# Patient Record
Sex: MALE | Race: WHITE | NOT HISPANIC OR LATINO | Employment: OTHER | URBAN - METROPOLITAN AREA
[De-identification: names, ages, dates, MRNs, and addresses within clinical notes are randomized per-mention and may not be internally consistent; named-entity substitution may affect disease eponyms.]

---

## 2019-06-04 ENCOUNTER — OFFICE VISIT (OUTPATIENT)
Dept: SLEEP MEDICINE | Facility: HOSPITAL | Age: 74
End: 2019-06-04

## 2019-06-04 VITALS
DIASTOLIC BLOOD PRESSURE: 68 MMHG | HEART RATE: 98 BPM | SYSTOLIC BLOOD PRESSURE: 133 MMHG | HEIGHT: 72 IN | BODY MASS INDEX: 42.66 KG/M2 | OXYGEN SATURATION: 95 % | WEIGHT: 315 LBS

## 2019-06-04 DIAGNOSIS — G47.33 OBSTRUCTIVE SLEEP APNEA: Primary | ICD-10-CM

## 2019-06-04 PROCEDURE — G0463 HOSPITAL OUTPT CLINIC VISIT: HCPCS

## 2019-06-04 NOTE — PROGRESS NOTES
"Sleep Medicine visit Note    Name: Kiet Briceño  Age: 73 y.o.  Sex: male  :  1945  MRN: [id number]  Date of Service: 2019    History of Present Illness:   Kiet Briceño is a 73 y.o. male comes for a follow up visit.     Patients Polysomnography performed at sleep medicine specialists on [facility] in 2015 has revealed ENRIKE with an AHI of 108 per hour of sleep. minimum SPO2  was 67 %. [place] Sleepiness Scale score prior to CPAP therapy was 18/24.    The patient has Severe obstructive sleep apnea. The patient is on BiPAP therapy at a mean pressure of 18/14 cms of water. Residual AHI 1.1/ sleep hour. Compliance data to indicate 100% usage for more than 4 hours with an average usage of 8 hours and 52 minutes.  [place] sleepiness scale score with CPAP therapy is 2/24 with CPAP therapy.  Patient has 2 BiPAP machines and the one he brought he uses 30% and he has another machine that he used for the rest of the time.    The patient is present on BiPAP machine is defective and is not functioning well and he needs a new BiPAP machine.    Review of Systems - Negative except frequent urination, arthralgia and myalgia, occasional skin lesions.  No complaints of chest pain or shortness of breath or palpitations.    PMH, Surgical Hx, current Medications, Allergies, Family Hx, Social Hx and problem list were reviewed and updated in the chart.    Objective:  Vitals:    19 0900   BP: 133/68   BP Location: Left arm   Patient Position: Sitting   Pulse: 98   SpO2: 95%   Weight: (!) 155 kg (342 lb 9.6 oz)   Height: 182.9 cm (72\")    Body mass index is 46.46 kg/m².   Neck Circumference: 19.5 inches   GEN: No significant distress, the patient is well developed, well nourished.  HEENT: pupils equal, round and reactive to light, extraocular movements intact, conjunctiva not injected bilaterally, nasopharyngeal mucosa moist, no lesions appreciated, Mallampati score IV (only hard palate visible)  NECK: " Supple, no jugular venous distention, no thyromegaly, no bruits appreciated  LUNGS: Clear to auscultation bilaterally.    CV: regular rate and rhythm, no gallops, murmurs or rubs  EXT: no cyanosis, clubbing, or edema   NEURO: alert and oriented x 3, motor functions grossly intact, CN II-XII grossly intact  PSYCH:Normal mood and affect.    Diagnostic data review: Patients Polysomnography performed at sleep medicine specialists on [facility] in June 2015 has revealed ENRIKE with an AHI of 108 per hour of sleep. minimum SPO2  was 67 %. [place] Sleepiness Scale score prior to CPAP therapy was 18/24.    Assessment and PLAN:   The patient has severe obstructive sleep apnea syndrome and is on BiPAP. The patient is compliant and is benefiting from it.  I will continue present CPAP therapy and will see the patient for follow-up in 3 months.    The patient is present on BiPAP machine is defective and is not functioning well and he needs a new BiPAP machine.  We will order a new auto BiPAP machine.    I have discussed the findings of my evaluation with the patient at length, instructed the patient on basic sleep hygiene, stressing the importance of regular sleep schedule without naps and with 8 hours of sleep per night, avoiding alcohol and sedative medications, limiting caffeine consumption, and implementing a healthy diet and exercise program and not to drive if patient is sleepy.       Karla Hartman MD  6/4/2019  10:40 AM

## 2019-08-20 ENCOUNTER — OFFICE VISIT (OUTPATIENT)
Dept: SLEEP MEDICINE | Facility: HOSPITAL | Age: 74
End: 2019-08-20

## 2019-08-20 VITALS
DIASTOLIC BLOOD PRESSURE: 75 MMHG | HEART RATE: 99 BPM | OXYGEN SATURATION: 96 % | SYSTOLIC BLOOD PRESSURE: 153 MMHG | BODY MASS INDEX: 42.66 KG/M2 | HEIGHT: 72 IN | WEIGHT: 315 LBS

## 2019-08-20 DIAGNOSIS — E66.01 MORBIDLY OBESE (HCC): ICD-10-CM

## 2019-08-20 DIAGNOSIS — G47.33 OBSTRUCTIVE SLEEP APNEA: Primary | ICD-10-CM

## 2019-08-20 PROCEDURE — G0463 HOSPITAL OUTPT CLINIC VISIT: HCPCS

## 2019-08-20 NOTE — PROGRESS NOTES
"Sleep Medicine visit Note    Name: Kiet Briceño  Age: 74 y.o.  Sex: male  :  1945  MRN: [id number]  Date of Service: 2019    History of Present Illness:   Kiet Briceño is a 74 y.o. male comes for a follow up visit.     Patients Polysomnography performed at sleep medicine specialists on [facility] in 2015 has revealed ENRIKE with an AHI of 108 per hour of sleep. minimum SPO2  was 67 %. [place] Sleepiness Scale score prior to CPAP therapy was 18/24.    The patient now has a new [employer] auto BiPAP machine and initially struggled with the mask and finally got that worked out and for the last 4 days has used it every night for an average usage of 9 hours and 27 minutes and minimum EPAP pressure 10 cm of water maximum IPAP pressure 20 cm of water and pressure support 4 cm water.  Residual AHI 0.1 and patient when he used it is benefiting from it.  Now that he has a new mask he will use it regularly.    Sleep schedule 12:10 PM to 6:30 AM and gets about 7 to 9 hours of sleep.  Sleepiness scale score is 2 today.    Review of Systems - Negative except frequent urination, arthralgia and myalgia, occasional skin lesions.  No complaints of chest pain or shortness of breath or palpitations.    PMH, Surgical Hx, current Medications, Allergies, Family Hx, Social Hx and problem list were reviewed and updated in the chart.    Objective:  Vitals:    19 0700   BP: 153/75   BP Location: Left arm   Patient Position: Sitting   Pulse: 99   SpO2: 96%   Weight: (!) 164 kg (362 lb)   Height: 182.9 cm (72\")    Body mass index is 49.1 kg/m².       GEN: No significant distress, the patient is well developed, well nourished.  HEENT: pupils equal, round and reactive to light, extraocular movements intact, conjunctiva not injected bilaterally, nasopharyngeal mucosa moist, no lesions appreciated, Mallampati score IV (only hard palate visible)  NECK: Supple, no jugular venous distention, no thyromegaly, no bruits " appreciated  LUNGS: Clear to auscultation bilaterally.    CV: regular rate and rhythm, no gallops, murmurs or rubs  EXT: no cyanosis, clubbing, or edema   NEURO: alert and oriented x 3, motor functions grossly intact, CN II-XII grossly intact  PSYCH:Normal mood and affect.    Diagnostic data review: Patients Polysomnography performed at sleep medicine specialists on [facility] in June 2015 has revealed ENRIKE with an AHI of 108 per hour of sleep. minimum SPO2  was 67 %. [place] Sleepiness Scale score prior to CPAP therapy was 18/24.    Assessment and PLAN:   The patient has severe obstructive sleep apnea syndrome and is on auto BiPAP.  The patient has a new auto BiPAP machine and for the last 4 days he is able to use it regularly and finally got a mask that he likes.  I will see him for follow-up in a month.  I have discussed the findings of my evaluation with the patient at length, instructed the patient on basic sleep hygiene, stressing the importance of regular sleep schedule without naps and with 8 hours of sleep per night, avoiding alcohol and sedative medications, limiting caffeine consumption, and implementing a healthy diet and exercise program and not to drive if patient is sleepy.       Karla Hartman MD  8/20/2019  8:26 AM

## 2019-09-24 ENCOUNTER — APPOINTMENT (OUTPATIENT)
Dept: SLEEP MEDICINE | Facility: HOSPITAL | Age: 74
End: 2019-09-24

## 2019-09-24 ENCOUNTER — DOCUMENTATION (OUTPATIENT)
Dept: SLEEP MEDICINE | Facility: HOSPITAL | Age: 74
End: 2019-09-24

## 2019-09-24 NOTE — PROGRESS NOTES
BiPAP compliance data from 8/25/2019 to 9/23/2019 reviewed and compliance data indicate 100% usage for the last 30 days with average usage of 9 hours and 22 minutes and mean BiPAP pressure 15.9/10 cms and residual AHI 0.9.  Patient is compliant and benefiting from BiPAP therapy.    Karla Hartman MD  9/24/2019

## 2021-03-09 DIAGNOSIS — Z23 IMMUNIZATION DUE: ICD-10-CM

## 2022-06-29 ENCOUNTER — OFFICE (OUTPATIENT)
Dept: URBAN - METROPOLITAN AREA CLINIC 75 | Facility: CLINIC | Age: 77
End: 2022-06-29

## 2022-06-29 VITALS
WEIGHT: 315 LBS | HEIGHT: 72 IN | SYSTOLIC BLOOD PRESSURE: 162 MMHG | DIASTOLIC BLOOD PRESSURE: 98 MMHG | HEART RATE: 98 BPM | OXYGEN SATURATION: 95 %

## 2022-06-29 DIAGNOSIS — R11.0 NAUSEA: ICD-10-CM

## 2022-06-29 DIAGNOSIS — R10.13 EPIGASTRIC PAIN: ICD-10-CM

## 2022-06-29 DIAGNOSIS — K59.00 CONSTIPATION, UNSPECIFIED: ICD-10-CM

## 2022-06-29 PROCEDURE — 99204 OFFICE O/P NEW MOD 45 MIN: CPT | Performed by: INTERNAL MEDICINE

## 2022-07-05 ENCOUNTER — ON CAMPUS - OUTPATIENT (OUTPATIENT)
Dept: URBAN - METROPOLITAN AREA HOSPITAL 108 | Facility: HOSPITAL | Age: 77
End: 2022-07-05
Payer: MEDICARE

## 2022-07-05 DIAGNOSIS — R10.13 EPIGASTRIC PAIN: ICD-10-CM

## 2022-07-05 DIAGNOSIS — D12.4 BENIGN NEOPLASM OF DESCENDING COLON: ICD-10-CM

## 2022-07-05 DIAGNOSIS — K31.89 OTHER DISEASES OF STOMACH AND DUODENUM: ICD-10-CM

## 2022-07-05 DIAGNOSIS — R11.0 NAUSEA: ICD-10-CM

## 2022-07-05 DIAGNOSIS — Z12.11 ENCOUNTER FOR SCREENING FOR MALIGNANT NEOPLASM OF COLON: ICD-10-CM

## 2022-07-05 PROCEDURE — 43239 EGD BIOPSY SINGLE/MULTIPLE: CPT | Performed by: INTERNAL MEDICINE

## 2022-07-05 PROCEDURE — 45385 COLONOSCOPY W/LESION REMOVAL: CPT | Mod: PT | Performed by: INTERNAL MEDICINE

## 2022-09-22 ENCOUNTER — IMMUNIZATION (OUTPATIENT)
Dept: VACCINE CLINIC | Facility: HOSPITAL | Age: 77
End: 2022-09-22

## 2022-09-22 DIAGNOSIS — Z23 NEED FOR VACCINATION: Primary | ICD-10-CM

## 2022-09-22 PROCEDURE — 91312 HC SARSCOV2 VAC 30MCG/0.3ML IM BIVALENT BOOSTER 12 YRS AND OLDER: CPT | Performed by: INTERNAL MEDICINE

## 2022-09-22 PROCEDURE — 0124A: CPT | Performed by: INTERNAL MEDICINE
